# Patient Record
Sex: FEMALE | Race: WHITE
[De-identification: names, ages, dates, MRNs, and addresses within clinical notes are randomized per-mention and may not be internally consistent; named-entity substitution may affect disease eponyms.]

---

## 2018-04-13 ENCOUNTER — HOSPITAL ENCOUNTER (EMERGENCY)
Age: 53
LOS: 1 days | Discharge: HOME | End: 2018-04-14

## 2018-04-13 ENCOUNTER — HOSPITAL ENCOUNTER (EMERGENCY)
Dept: HOSPITAL 91 - E/R | Age: 53
LOS: 1 days | Discharge: HOME | End: 2018-04-14
Payer: COMMERCIAL

## 2018-04-13 DIAGNOSIS — I10: ICD-10-CM

## 2018-04-13 DIAGNOSIS — E03.9: ICD-10-CM

## 2018-04-13 DIAGNOSIS — M54.5: Primary | ICD-10-CM

## 2018-04-13 DIAGNOSIS — E11.9: ICD-10-CM

## 2018-04-13 DIAGNOSIS — Z79.4: ICD-10-CM

## 2018-04-13 DIAGNOSIS — Z79.82: ICD-10-CM

## 2018-04-13 LAB — URINE PH (DIP) POC: 5.5 (ref 5–8.5)

## 2018-04-13 PROCEDURE — 99284 EMERGENCY DEPT VISIT MOD MDM: CPT

## 2018-04-13 PROCEDURE — 81003 URINALYSIS AUTO W/O SCOPE: CPT

## 2018-04-13 PROCEDURE — 96372 THER/PROPH/DIAG INJ SC/IM: CPT

## 2018-04-14 RX ADMIN — KETOROLAC TROMETHAMINE 1 MG: 30 INJECTION, SOLUTION INTRAMUSCULAR at 00:16

## 2018-11-07 ENCOUNTER — HOSPITAL ENCOUNTER (EMERGENCY)
Age: 53
Discharge: HOME | End: 2018-11-07

## 2018-11-07 ENCOUNTER — HOSPITAL ENCOUNTER (EMERGENCY)
Dept: HOSPITAL 91 - FTE | Age: 53
Discharge: HOME | End: 2018-11-07
Payer: COMMERCIAL

## 2018-11-07 DIAGNOSIS — S99.922A: Primary | ICD-10-CM
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
## 2018-11-07 DIAGNOSIS — Z79.82: ICD-10-CM

## 2018-11-07 DIAGNOSIS — E11.9: ICD-10-CM

## 2018-11-07 DIAGNOSIS — Z79.84: ICD-10-CM
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
## 2018-11-07 DIAGNOSIS — I10: ICD-10-CM

## 2018-11-07 PROCEDURE — 99282 EMERGENCY DEPT VISIT SF MDM: CPT

## 2019-03-11 ENCOUNTER — HOSPITAL ENCOUNTER (EMERGENCY)
Dept: HOSPITAL 91 - E/R | Age: 54
Discharge: HOME | End: 2019-03-11
Payer: COMMERCIAL

## 2019-03-11 ENCOUNTER — HOSPITAL ENCOUNTER (EMERGENCY)
Dept: HOSPITAL 10 - E/R | Age: 54
Discharge: HOME | End: 2019-03-11
Payer: COMMERCIAL

## 2019-03-11 VITALS — SYSTOLIC BLOOD PRESSURE: 135 MMHG | HEART RATE: 65 BPM | RESPIRATION RATE: 18 BRPM | DIASTOLIC BLOOD PRESSURE: 88 MMHG

## 2019-03-11 VITALS
BODY MASS INDEX: 32.81 KG/M2 | WEIGHT: 204.15 LBS | BODY MASS INDEX: 32.81 KG/M2 | HEIGHT: 66 IN | WEIGHT: 204.15 LBS | HEIGHT: 66 IN

## 2019-03-11 DIAGNOSIS — E03.9: ICD-10-CM

## 2019-03-11 DIAGNOSIS — Z79.82: ICD-10-CM

## 2019-03-11 DIAGNOSIS — F41.9: Primary | ICD-10-CM

## 2019-03-11 DIAGNOSIS — E11.9: ICD-10-CM

## 2019-03-11 DIAGNOSIS — I10: ICD-10-CM

## 2019-03-11 DIAGNOSIS — Z79.4: ICD-10-CM

## 2019-03-11 LAB
ADD MAN DIFF?: NO
ALANINE AMINOTRANSFERASE: 30 IU/L (ref 13–69)
ALBUMIN/GLOBULIN RATIO: 1.44
ALBUMIN: 4.2 G/DL (ref 3.3–4.9)
ALKALINE PHOSPHATASE: 46 IU/L (ref 42–121)
ANION GAP: 8 (ref 5–13)
ASPARTATE AMINO TRANSFERASE: 24 IU/L (ref 15–46)
BASOPHIL #: 0.1 10^3/UL (ref 0–0.1)
BASOPHILS %: 0.7 % (ref 0–2)
BILIRUBIN,DIRECT: 0 MG/DL (ref 0–0.2)
BILIRUBIN,TOTAL: 0.5 MG/DL (ref 0.2–1.3)
BLOOD UREA NITROGEN: 13 MG/DL (ref 7–20)
CALCIUM: 10.1 MG/DL (ref 8.4–10.2)
CARBON DIOXIDE: 32 MMOL/L (ref 21–31)
CHLORIDE: 104 MMOL/L (ref 97–110)
CREATININE: 0.51 MG/DL (ref 0.44–1)
EOSINOPHILS #: 0.1 10^3/UL (ref 0–0.5)
EOSINOPHILS %: 0.7 % (ref 0–7)
GLOBULIN: 2.9 G/DL (ref 1.3–3.2)
GLUCOSE: 117 MG/DL (ref 70–220)
HEMATOCRIT: 39.2 % (ref 37–47)
HEMOGLOBIN: 12.7 G/DL (ref 12–16)
IMMATURE GRANS #M: 0.04 10^3/UL (ref 0–0.03)
IMMATURE GRANS % (M): 0.4 % (ref 0–0.43)
LIPASE: 83 U/L (ref 23–300)
LYMPHOCYTES #: 2.9 10^3/UL (ref 0.8–2.9)
LYMPHOCYTES %: 28.9 % (ref 15–51)
MEAN CORPUSCULAR HEMOGLOBIN: 28.2 PG (ref 29–33)
MEAN CORPUSCULAR HGB CONC: 32.4 G/DL (ref 32–37)
MEAN CORPUSCULAR VOLUME: 87.1 FL (ref 82–101)
MEAN PLATELET VOLUME: 9.8 FL (ref 7.4–10.4)
MONOCYTE #: 0.7 10^3/UL (ref 0.3–0.9)
MONOCYTES %: 6.6 % (ref 0–11)
NEUTROPHIL #: 6.3 10^3/UL (ref 1.6–7.5)
NEUTROPHILS %: 62.7 % (ref 39–77)
NUCLEATED RED BLOOD CELLS #: 0 10^3/UL (ref 0–0)
NUCLEATED RED BLOOD CELLS%: 0 /100WBC (ref 0–0)
PLATELET COUNT: 301 10^3/UL (ref 140–415)
POTASSIUM: 4.5 MMOL/L (ref 3.5–5.1)
RED BLOOD COUNT: 4.5 10^6/UL (ref 4.2–5.4)
RED CELL DISTRIBUTION WIDTH: 12.7 % (ref 11.5–14.5)
SODIUM: 144 MMOL/L (ref 135–144)
TOTAL PROTEIN: 7.1 G/DL (ref 6.1–8.1)
WHITE BLOOD COUNT: 10.1 10^3/UL (ref 4.8–10.8)

## 2019-03-11 PROCEDURE — 80053 COMPREHEN METABOLIC PANEL: CPT

## 2019-03-11 PROCEDURE — 85025 COMPLETE CBC W/AUTO DIFF WBC: CPT

## 2019-03-11 PROCEDURE — 99283 EMERGENCY DEPT VISIT LOW MDM: CPT

## 2019-03-11 PROCEDURE — 36415 COLL VENOUS BLD VENIPUNCTURE: CPT

## 2019-03-11 PROCEDURE — 83690 ASSAY OF LIPASE: CPT

## 2019-03-11 RX ADMIN — ALPRAZOLAM 1 MG: 0.25 TABLET ORAL at 15:59

## 2019-03-11 NOTE — ERD
ER Documentation


Chief Complaint


Chief Complaint





headache





HPI


The patient is a 53-year-old female, presenting with vague intermittent headache


that began around 9:30 AM today, had similar symptom previously, complains of a 


lot of stress in her life and substernal chest discomfort but mainly her 


headache.  She denies fever, chills, neck pain, chest pain with 


vomiting/radiation/exertion/diaphoresis, dyspnea, abdominal pain, vomiting, 


dizzy, diarrhea.  She does not smoke nor drink





Past medical history: Diabetes mellitus, dyslipidemia, hypertension, 


hypothyroidism, anxiety


Past surgical history: 2 , bilateral cataract





ROS


All systems reviewed and are negative except as per history of present illness.





Medications


Home Meds


Active Scripts


Ibuprofen* (Motrin*) 600 Mg Tab, 600 MG PO Q6H PRN for PAIN AND OR ELEVATED 


TEMP, #20 TAB


   Prov:RAMONE PLASCENCIA MD         3/11/19


Reported Medications


Levothyroxine Sodium* (Levothyroxine Sodium*) 50 Mcg Tablet, 50 MCG PO BEFORE 


BREAKFAST, #30 TAB


   3/11/19


Benazepril Hcl* (Benazepril Hcl*) 20 Mg Tablet, 20 MG PO DAILY, #30 TAB


   3/11/19


Naproxen* (Naproxen*) 500 Mg Tablet, 500 MG PO BID, TAB


   3/11/19


Aspirin* (Aspirin* Chew) 81 Mg Tab.chew, 81 MG PO DAILY, TAB.CHEW


   3/11/19


Simvastatin* (Zocor*) 20 Mg Tablet, 20 MG PO QHS, #30 TAB


   3/11/19


Metformin Hcl* (Metformin Hcl*) 1,000 Mg Tablet, 1000 MG PO WITH BREAKFAST 


DINNE, #60 TAB


   3/11/19


Insulin Regular, Human (Humulin R) 100 Unit/1 Ml Vial, 10 UNIT IJ BID, VIAL


   3/11/19


Insulin NPH Human Isophane (Humulin N) 100 Unit/1 Ml Vial, 54 UNIT SQ BID, VIAL


   3/11/19


Discontinued Reported Medications


Insulin Regular, Human (Humulin R) 100 Units/Ml Vial, 10 UNIT SC BID, VIAL


   8/7/15


Nph, Human Insulin Isophane (Humulin N) 100 Units/Ml Vial, 50 UNIT SC BID, VIAL


   8/7/15


Aspirin Ec (Aspir 81) 81 Mg Tablet.dr, 81 MG PO DAILY, TAB


   8/7/15


Simvastatin (Simvastatin) 40 Mg Tablet, 40 MG PO HS, TAB


   8/7/15


Famotidine* (Famotidine*) 20 Mg Tablet, 20 MG PO DAILY, TAB


   8/7/15


Benazepril Hcl* (Benazepril Hcl*) 10 Mg Tablet, 10 MG PO DAILY, TAB


   8/7/15


Metformin* (Glucophage*) 1,000 Mg Tablet, 1000 MG PO BID, TAB


   8/7/15


Levothyroxine Sodium* (Levothyroxine Sodium*) 50 Mcg Tablet, 50 MCG PO AC 


BREAKFAST, TAB


   8/7/15


Discontinued Scripts


Tramadol Hcl* (Ultram*) 50 Mg Tablet, 50 MG PO Q6H PRN for PAIN, #14 TAB


   Prov:RAMONE PLASCENCIA MD         18





Allergies


Allergies:  


Coded Allergies:  


     No Known Allergy (Unverified , 3/11/19)





PMhx/Soc


History of Surgery:  Yes (c/section x2)


Anesthesia Reaction:  No


Hx Neurological Disorder:  No


Hx Respiratory Disorders:  No


Hx Cardiac Disorders:  Yes (htn,hypercholesterolemia)


Hx Psychiatric Problems:  No


Hx Miscellaneous Medical Probl:  Yes (dm,thyroid problem)


Hx Alcohol Use:  No


Hx Substance Use:  No


Hx Tobacco Use:  No


Smoking Status:  Never smoker





Physical Exam


Vitals





Vital Signs


  Date      Temp  Pulse  Resp  B/P (MAP)   Pulse Ox  O2          O2 Flow    FiO2


Time                                                 Delivery    Rate


   3/11/19           65    18      135/88        98  Room Air


     16:28                          (104)


   3/11/19  97.0     84    20      155/66        98


     11:27                           (95)





Physical Exam


 Const:      No acute distress.


 Head:        Atraumatic.


 Eyes:       Normal Conjunctiva.


 ENT:         Normal External Ears, Nose and Mouth.


 Neck:        Full range of motion.  No meningismus.


 Resp:         Clear to auscultation bilaterally.


 Cardio:       Regular rate and rhythm.


 Abd:         Soft,  non distended, normal bowel sounds, non tender.


 Skin:         No petechiae or rashes.


 Back:        No midline or flank tenderness.


 Ext:          No cyanosis, or edema.


 Neur:        Awake and alert. No focal deficit


 Psych:        Anxious


Result Diagram:  


3/11/19 1515                                                                    


           3/11/19 1515





Results 24 hrs





Laboratory Tests


              Test
                                 3/11/19
15:15


              White Blood Count                     10.1 10^3/ul


              Red Blood Count                       4.50 10^6/ul


              Hemoglobin                               12.7 g/dl


              Hematocrit                                  39.2 %


              Mean Corpuscular Volume                    87.1 fl


              Mean Corpuscular Hemoglobin                28.2 pg


              Mean Corpuscular Hemoglobin
Concent     32.4 g/dl 



              Red Cell Distribution Width                 12.7 %


              Platelet Count                         301 10^3/UL


              Mean Platelet Volume                        9.8 fl


              Immature Granulocytes %                    0.400 %


              Neutrophils %                               62.7 %


              Lymphocytes %                               28.9 %


              Monocytes %                                  6.6 %


              Eosinophils %                                0.7 %


              Basophils %                                  0.7 %


              Nucleated Red Blood Cells %            0.0 /100WBC


              Immature Granulocytes #              0.040 10^3/ul


              Neutrophils #                          6.3 10^3/ul


              Lymphocytes #                          2.9 10^3/ul


              Monocytes #                            0.7 10^3/ul


              Eosinophils #                          0.1 10^3/ul


              Basophils #                            0.1 10^3/ul


              Nucleated Red Blood Cells #            0.0 10^3/ul


              Sodium Level                            144 mmol/L


              Potassium Level                         4.5 mmol/L


              Chloride Level                          104 mmol/L


              Carbon Dioxide Level                     32 mmol/L


              Anion Gap                                        8


              Blood Urea Nitrogen                       13 mg/dl


              Creatinine                              0.51 mg/dl


              Est Glomerular Filtrat Rate
mL/min   > 60 mL/min 



              Glucose Level                            117 mg/dl


              Calcium Level                           10.1 mg/dl


              Total Bilirubin                          0.5 mg/dl


              Direct Bilirubin                        0.00 mg/dl


              Indirect Bilirubin                       0.5 mg/dl


              Aspartate Amino Transf
(AST/SGOT)         24 IU/L 



              Alanine Aminotransferase
(ALT/SGPT)       30 IU/L 



              Alkaline Phosphatase                       46 IU/L


              Total Protein                             7.1 g/dl


              Albumin                                   4.2 g/dl


              Globulin                                 2.90 g/dl


              Albumin/Globulin Ratio                        1.44


              Lipase                                      83 U/L





Current Medications


 Medications
   Dose
          Sig/Areli
       Start Time
   Status  Last


 (Trade)       Ordered        Route
 PRN     Stop Time              Admin
Dose


                              Reason                                Admin


 Alprazolam
    0.25 mg        ONCE  ONCE
    3/11/19       DC           3/11/19


(Xanax)                       PO
            15:30
                       15:59



                                             3/11/19 15:31








Procedures/MDM


EKG:                           Read by emergency physician


Rate/Rhythm:          Normal Sinus Rhythm 77  beats/min


QRS, ST, T-waves:    No ST elevation, no T inversion, LAE, LAD


Impression:             Abnormal   EKG





MEDICAL MAKING DECISION: The patient is a 53-year-old female, presenting with 


acute anxiety, was treated with Xanax 0.25 mg p.o. for acute anxiety with good 


response, is stable for outpatient follow-up





The differential diagnoses considered include but are not limited to 


subarachnoid hemorrhage, occult trauma, CVA, meningitis, encephalitis, 


hypertension, tension, migraine, cluster, narcotic withdrawal, cervical spine 


disease.





Departure


Diagnosis:  


   Primary Impression:  


   Anxiety


   Additional Impression:  


   Headache


Condition:  Good


Comments


She was discharged with Motrin





I discussed the findings with the patient. I advised the patient to follow-up 


with the primary physician in about 2-3 days, sooner if needed and return if any


concern.





Disclaimer: Inadvertent spelling and grammatical errors are likely due to 


EHR/dictation software use and do not reflect on the overall quality of patient 


care. Also, please note that the electronic time recorded on this note does not 


necessarily reflect the actual time of the patient encounter.











RAMONE PLASCENCIA MD              Mar 11, 2019 14:33